# Patient Record
Sex: FEMALE | Race: WHITE | NOT HISPANIC OR LATINO | URBAN - METROPOLITAN AREA
[De-identification: names, ages, dates, MRNs, and addresses within clinical notes are randomized per-mention and may not be internally consistent; named-entity substitution may affect disease eponyms.]

---

## 2017-11-09 NOTE — PATIENT DISCUSSION
Pt educated on surgery risks,benefits, and alternatives. Recovery is about 1 week and vision may improve afterwards.

## 2017-12-07 NOTE — PATIENT DISCUSSION
Good Po Progress. Post op instructions given to patient. Pt verbalized understanding. Stressed AIR BUBBLE blocking VA some. Will dissipate in a few days. VA will be blurry following surgery and should get better with time. STRESSED no swimming, nothing too strenuous, eye patch needs to be worn at any time of sleep. GTTS instructions given. S/S of RT/RD discussed in detail. 24/7 ER Reviewed. Advised pt to call with any VA changes.

## 2017-12-07 NOTE — PATIENT DISCUSSION
Wear patch 2 more days. Pt may resume normal daily activities but highly advised against scuba diving and heavy lifting. Biking is okay.

## 2017-12-11 NOTE — PATIENT DISCUSSION
Reduce Pred to q4hrs and discontinue vigamox on Thursday. Pt educated floaters will dissipate on their own in a few days.

## 2017-12-19 NOTE — PATIENT DISCUSSION
Good Po Progress. Post op instructions given to patient. Pt verbalized understanding. Stressed AIR BUBBLE blocking VA some. Will dissipate in a few days. VA will be blurry following surgery and should get better with time. STRESSED no swimming, nothing too strenuous, eye patch needs to be worn at any time of sleep. GTTS instructions given, continue Pred Forte QID for 1 more month. S/S of RT/RD discussed in detail. 24/7 ER Reviewed. Advised pt to call with any VA changes.

## 2017-12-19 NOTE — PATIENT DISCUSSION
Pt may resume normal daily activities, ok to scuba dive in February. Biking is okay.  Ok to go to dentist.

## 2019-03-04 NOTE — PATIENT DISCUSSION
Reassured patient regarding symptomatic floaters. [FreeTextEntry1] : Patient comes in today for followup of her arthritic right hip and bilateral osteoarthritis of the knees. I started her on Mobic in January she can she comes in today this say that the medication is working nicely for her. I have given her another prescription for Mobic and she will remain on this for another month. If it still working she'll go to her primary care doctor to continue on it. End of dictation

## 2019-06-04 NOTE — PROCEDURE NOTE: SURGICAL
<p>Prior to commencing surgery patient identification, surgical procedure, site, and side were confirmed by Dr. Ekaterina Vee. Following topical proparacaine anesthesia, the patient was positioned at the YAG laser, a contact lens coupled to the cornea with methylcellulose and an axial posterior capsulotomy performed without complication using 3.7 Mj x 31. Excess methylcellulose was washed from the eye, one drop of Alphagan was instilled and the patient returned to the holding area having tolerated the procedure well and without complication. </p><p>MRN#[id number]</p>

## 2020-06-26 NOTE — PATIENT DISCUSSION
Discussed benefits of vitamins, dark leafy green vegetables and omega III oils in supplements or diet.

## 2020-11-09 ENCOUNTER — APPOINTMENT (RX ONLY)
Dept: RURAL CLINIC 4 | Facility: CLINIC | Age: 50
Setting detail: DERMATOLOGY
End: 2020-11-09

## 2020-11-09 VITALS — TEMPERATURE: 97.8 F

## 2020-11-09 DIAGNOSIS — D22 MELANOCYTIC NEVI: ICD-10-CM

## 2020-11-09 DIAGNOSIS — D485 NEOPLASM OF UNCERTAIN BEHAVIOR OF SKIN: ICD-10-CM

## 2020-11-09 DIAGNOSIS — L82.1 OTHER SEBORRHEIC KERATOSIS: ICD-10-CM

## 2020-11-09 DIAGNOSIS — L81.4 OTHER MELANIN HYPERPIGMENTATION: ICD-10-CM

## 2020-11-09 DIAGNOSIS — D18.0 HEMANGIOMA: ICD-10-CM

## 2020-11-09 DIAGNOSIS — L57.8 OTHER SKIN CHANGES DUE TO CHRONIC EXPOSURE TO NONIONIZING RADIATION: ICD-10-CM

## 2020-11-09 PROBLEM — D22.5 MELANOCYTIC NEVI OF TRUNK: Status: ACTIVE | Noted: 2020-11-09

## 2020-11-09 PROBLEM — D48.5 NEOPLASM OF UNCERTAIN BEHAVIOR OF SKIN: Status: ACTIVE | Noted: 2020-11-09

## 2020-11-09 PROBLEM — D23.72 OTHER BENIGN NEOPLASM OF SKIN OF LEFT LOWER LIMB, INCLUDING HIP: Status: ACTIVE | Noted: 2020-11-09

## 2020-11-09 PROBLEM — D18.01 HEMANGIOMA OF SKIN AND SUBCUTANEOUS TISSUE: Status: ACTIVE | Noted: 2020-11-09

## 2020-11-09 PROCEDURE — 99203 OFFICE O/P NEW LOW 30 MIN: CPT | Mod: 25

## 2020-11-09 PROCEDURE — ? COUNSELING

## 2020-11-09 PROCEDURE — 11301 SHAVE SKIN LESION 0.6-1.0 CM: CPT

## 2020-11-09 PROCEDURE — ? SHAVE REMOVAL

## 2020-11-09 ASSESSMENT — LOCATION DETAILED DESCRIPTION DERM
LOCATION DETAILED: RIGHT ANTERIOR PROXIMAL THIGH
LOCATION DETAILED: SUPERIOR LUMBAR SPINE
LOCATION DETAILED: LEFT ANTERIOR DISTAL THIGH
LOCATION DETAILED: RIGHT INFERIOR MEDIAL MIDBACK
LOCATION DETAILED: PERIUMBILICAL SKIN
LOCATION DETAILED: RIGHT ANTERIOR DISTAL THIGH
LOCATION DETAILED: LEFT MEDIAL SUPERIOR CHEST
LOCATION DETAILED: LEFT INFERIOR CENTRAL MALAR CHEEK
LOCATION DETAILED: RIGHT MEDIAL UPPER BACK

## 2020-11-09 ASSESSMENT — LOCATION SIMPLE DESCRIPTION DERM
LOCATION SIMPLE: RIGHT THIGH
LOCATION SIMPLE: LOWER BACK
LOCATION SIMPLE: CHEST
LOCATION SIMPLE: ABDOMEN
LOCATION SIMPLE: LEFT CHEEK
LOCATION SIMPLE: RIGHT UPPER BACK
LOCATION SIMPLE: LEFT THIGH
LOCATION SIMPLE: RIGHT LOWER BACK

## 2020-11-09 ASSESSMENT — LOCATION ZONE DERM
LOCATION ZONE: TRUNK
LOCATION ZONE: LEG
LOCATION ZONE: FACE

## 2020-11-09 NOTE — PROCEDURE: SHAVE REMOVAL
Medical Necessity Information: It is in your best interest to select a reason for this procedure from the list below. All of these items fulfill various CMS LCD requirements except the new and changing color options.
Medical Necessity Clause: The lesion was removed in itâs entirety and was medically necessary because the lesion that was treated was:
Lab: [facility], [place]
Body Location Override (Optional - Billing Will Still Be Based On Selected Body Map Location If Applicable): Right Mid Paraspinal Back
Detail Level: Detailed
Was A Bandage Applied: Yes
Size Of Lesion In Cm (Required): 0.8
X Size Of Lesion In Cm (Optional): 0
Biopsy Method: Dermablade
Anesthesia Type: 1% lidocaine with epinephrine
Anesthesia Volume In Cc: 1
Hemostasis: Electrocautery
Wound Care: Bacitracin
Path Notes (To The Dermatopathologist): Please check margins.
Render Path Notes In Note?: No
Consent: The providers intent is to therapeutically remove the lesion in it's entirety while at the same time obtaining a tissue sample for histopathologic examination. Consent was obtained from the patient. The risks and benefits to therapy were discussed in detail. Specifically, the risks of infection, scarring, bleeding, prolonged wound healing, incomplete removal, allergy to anesthesia, nerve injury and recurrence were addressed. Prior to the procedure, the treatment site was clearly identified and confirmed by the patient. All components of Universal Protocol/PAUSE Rule completed.
Post-Care Instructions: I reviewed with the patient in detail post-care instructions. Patient is to keep the biopsy site dry overnight, and then apply bacitracin twice daily until healed. Patient may apply hydrogen peroxide soaks to remove any crusting.
Notification Instructions: Patient will be notified of biopsy results. However, patient instructed to call the office if not contacted within 2 weeks.
Billing Type: [employer]

## 2021-05-18 ENCOUNTER — NEW PATIENT COMPREHENSIVE (OUTPATIENT)
Dept: URBAN - METROPOLITAN AREA CLINIC 44 | Facility: CLINIC | Age: 51
End: 2021-05-18

## 2021-05-18 DIAGNOSIS — H52.7: ICD-10-CM

## 2021-05-18 PROCEDURE — 92015 DETERMINE REFRACTIVE STATE: CPT

## 2021-05-18 PROCEDURE — 92004 COMPRE OPH EXAM NEW PT 1/>: CPT

## 2021-05-18 ASSESSMENT — VISUAL ACUITY
OD_CC: J8
OS_SC: 20/400
OD_CC: 20/20
OS_CC: >J12
OS_SC: >J12
OD_SC: J12
OS_CC: 20/50
OD_SC: 20/30

## 2021-05-18 ASSESSMENT — TONOMETRY
OS_IOP_MMHG: 16
OD_IOP_MMHG: 16

## 2022-05-18 ENCOUNTER — COMPREHENSIVE EXAM (OUTPATIENT)
Dept: URBAN - METROPOLITAN AREA CLINIC 47 | Facility: CLINIC | Age: 52
End: 2022-05-18

## 2022-05-18 DIAGNOSIS — H52.7: ICD-10-CM

## 2022-05-18 DIAGNOSIS — H25.813: ICD-10-CM

## 2022-05-18 PROCEDURE — 92014 COMPRE OPH EXAM EST PT 1/>: CPT

## 2022-05-18 PROCEDURE — 92015 DETERMINE REFRACTIVE STATE: CPT

## 2022-05-18 ASSESSMENT — TONOMETRY
OD_IOP_MMHG: 18
OS_IOP_MMHG: 18

## 2022-05-18 ASSESSMENT — VISUAL ACUITY
OD_CC: 20/20-1
OS_CC: 20/40-2
OD_CC: J3
OS_CC: J12

## 2023-01-13 NOTE — PATIENT DISCUSSION
Asymptomatic, no treatment needed. PCO is visually significant. Recommended YAG capsulotomy evaluation.

## 2023-07-07 ENCOUNTER — COMPREHENSIVE EXAM (OUTPATIENT)
Dept: URBAN - METROPOLITAN AREA CLINIC 47 | Facility: CLINIC | Age: 53
End: 2023-07-07

## 2023-07-07 DIAGNOSIS — H52.7: ICD-10-CM

## 2023-07-07 DIAGNOSIS — H25.813: ICD-10-CM

## 2023-07-07 PROCEDURE — 92015 DETERMINE REFRACTIVE STATE: CPT

## 2023-07-07 PROCEDURE — 92014 COMPRE OPH EXAM EST PT 1/>: CPT

## 2023-07-07 ASSESSMENT — VISUAL ACUITY
OS_CC: 20/60
OD_CC: J3
OD_CC: 20/25
OS_CC: J12

## 2023-07-07 ASSESSMENT — TONOMETRY
OD_IOP_MMHG: 16
OS_IOP_MMHG: 16

## 2024-07-01 ENCOUNTER — EMERGENCY VISIT (OUTPATIENT)
Dept: URBAN - METROPOLITAN AREA CLINIC 47 | Facility: CLINIC | Age: 54
End: 2024-07-01

## 2024-07-01 DIAGNOSIS — H16.141: ICD-10-CM

## 2024-07-01 PROCEDURE — 99213 OFFICE O/P EST LOW 20 MIN: CPT

## 2024-07-01 ASSESSMENT — VISUAL ACUITY
OS_SC: 20/200
OD_SC: 20/30-1

## 2024-07-01 ASSESSMENT — TONOMETRY
OD_IOP_MMHG: 16
OS_IOP_MMHG: 14

## 2024-07-12 ENCOUNTER — COMPREHENSIVE EXAM (OUTPATIENT)
Dept: URBAN - METROPOLITAN AREA CLINIC 47 | Facility: CLINIC | Age: 54
End: 2024-07-12

## 2024-07-12 DIAGNOSIS — H52.7: ICD-10-CM

## 2024-07-12 PROCEDURE — 92015 DETERMINE REFRACTIVE STATE: CPT

## 2024-07-12 PROCEDURE — 92014 COMPRE OPH EXAM EST PT 1/>: CPT

## 2024-07-12 ASSESSMENT — TONOMETRY
OS_IOP_MMHG: 18
OD_IOP_MMHG: 18

## 2024-07-12 ASSESSMENT — VISUAL ACUITY
OD_CC: 20/20
OD_CC: J3
OS_CC: J12
OS_CC: 20/40

## 2024-08-06 NOTE — PATIENT DISCUSSION
I explained that the floaters will slowly fade over a period of months to years but will not likely disappear completely. They are usually well-tolerated. Intermittent flashes will fade over time as well and do not add worrisome significance. Pt called to inquire about echo results and if pt is able to drive. Pt would like office to contact daughter Angela regarding this matter.    Please follow up  Angela's P#: [phone]

## 2025-08-04 ENCOUNTER — COMPREHENSIVE EXAM (OUTPATIENT)
Age: 55
End: 2025-08-04

## 2025-08-04 DIAGNOSIS — H25.813: ICD-10-CM

## 2025-08-04 DIAGNOSIS — H52.7: ICD-10-CM

## 2025-08-04 DIAGNOSIS — Z79.899: ICD-10-CM

## 2025-08-04 PROCEDURE — 92014 COMPRE OPH EXAM EST PT 1/>: CPT

## 2025-08-04 PROCEDURE — 92015 DETERMINE REFRACTIVE STATE: CPT

## 2025-08-04 PROCEDURE — 92134 CPTRZ OPH DX IMG PST SGM RTA: CPT

## 2025-08-13 ENCOUNTER — TECH ONLY (OUTPATIENT)
Age: 55
End: 2025-08-13

## 2025-08-13 DIAGNOSIS — Z79.899: ICD-10-CM

## 2025-08-13 PROCEDURE — 99211T TECH SERVICE

## 2025-08-13 PROCEDURE — 92083 EXTENDED VISUAL FIELD XM: CPT
